# Patient Record
Sex: FEMALE | Race: WHITE | Employment: OTHER | URBAN - METROPOLITAN AREA
[De-identification: names, ages, dates, MRNs, and addresses within clinical notes are randomized per-mention and may not be internally consistent; named-entity substitution may affect disease eponyms.]

---

## 2017-06-22 PROBLEM — N95.2 ATROPHIC VAGINITIS: Status: ACTIVE | Noted: 2017-06-22

## 2017-06-22 PROBLEM — N32.81 URGENCY-FREQUENCY SYNDROME: Status: ACTIVE | Noted: 2017-06-22

## 2017-06-22 PROBLEM — R35.1 NOCTURIA: Status: ACTIVE | Noted: 2017-06-22

## 2017-06-22 PROBLEM — N39.46 MIXED INCONTINENCE: Status: ACTIVE | Noted: 2017-06-22

## 2017-06-22 PROBLEM — N81.4 UTEROVAGINAL PROLAPSE: Status: ACTIVE | Noted: 2017-06-22

## 2017-09-14 PROBLEM — N81.4 UTEROVAGINAL PROLAPSE: Status: RESOLVED | Noted: 2017-06-22 | Resolved: 2017-09-14

## 2017-11-20 PROBLEM — E06.3 HYPOTHYROIDISM DUE TO HASHIMOTO'S THYROIDITIS: Status: ACTIVE | Noted: 2017-11-20

## 2017-11-20 PROBLEM — E03.8 HYPOTHYROIDISM DUE TO HASHIMOTO'S THYROIDITIS: Status: ACTIVE | Noted: 2017-11-20

## 2020-02-21 ENCOUNTER — OFFICE VISIT (OUTPATIENT)
Dept: ENDOCRINOLOGY | Age: 65
End: 2020-02-21
Payer: COMMERCIAL

## 2020-02-21 VITALS
OXYGEN SATURATION: 92 % | BODY MASS INDEX: 35.73 KG/M2 | HEIGHT: 60 IN | SYSTOLIC BLOOD PRESSURE: 138 MMHG | RESPIRATION RATE: 16 BRPM | DIASTOLIC BLOOD PRESSURE: 88 MMHG | HEART RATE: 61 BPM | WEIGHT: 182 LBS

## 2020-02-21 PROCEDURE — 4040F PNEUMOC VAC/ADMIN/RCVD: CPT | Performed by: INTERNAL MEDICINE

## 2020-02-21 PROCEDURE — 99204 OFFICE O/P NEW MOD 45 MIN: CPT | Performed by: INTERNAL MEDICINE

## 2020-02-21 PROCEDURE — 4004F PT TOBACCO SCREEN RCVD TLK: CPT | Performed by: INTERNAL MEDICINE

## 2020-02-21 PROCEDURE — 3017F COLORECTAL CA SCREEN DOC REV: CPT | Performed by: INTERNAL MEDICINE

## 2020-02-21 PROCEDURE — 1090F PRES/ABSN URINE INCON ASSESS: CPT | Performed by: INTERNAL MEDICINE

## 2020-02-21 PROCEDURE — 1123F ACP DISCUSS/DSCN MKR DOCD: CPT | Performed by: INTERNAL MEDICINE

## 2020-02-21 PROCEDURE — G8400 PT W/DXA NO RESULTS DOC: HCPCS | Performed by: INTERNAL MEDICINE

## 2020-02-21 PROCEDURE — G8427 DOCREV CUR MEDS BY ELIG CLIN: HCPCS | Performed by: INTERNAL MEDICINE

## 2020-02-21 PROCEDURE — G8484 FLU IMMUNIZE NO ADMIN: HCPCS | Performed by: INTERNAL MEDICINE

## 2020-02-21 PROCEDURE — G8417 CALC BMI ABV UP PARAM F/U: HCPCS | Performed by: INTERNAL MEDICINE

## 2020-02-21 RX ORDER — ACETAMINOPHEN 160 MG
2000 TABLET,DISINTEGRATING ORAL
COMMUNITY

## 2020-02-21 NOTE — PROGRESS NOTES
[address] [place] [address] Department of Endocrinology Diabetes and Metabolism   [address] [facility] [address]   Phone: [phone]  Fax: [phone]    Date of Service: 2/21/2020    Primary Care Physician: Alphonso Andre  Referring physician: Yolanda Byrd*  Provider: Ruby Pike MD        Reason for the visit:  Primary Hypothyroidism    History of Present Illness: The history is provided by the patient. No  was used. Accuracy of the patient data is excellent. Solomon Ruelas is a very pleasant 71 y.o. female seen today for management of hypothyroidism     The patient was diagnosed with hypothyroidism in her 45s and since then has been on thyroid hormones replacement. Last TSH 1.55 (10/10/2019) from referral  Thyroid Peroxidase Ab 43 (9/28/2017)    She complains of fatigue, dry skin with itchiness, cold and hot intolerance and constipation. Patient denied any history of  swelling in the area of the thyroid gland, weight loss, change in appetite, nervousness, anxiety, irritability, tremor, sweating, muscle weakness or difficulty sleeping. Patient also denied any facial swelling/puffiness, or depressed mood. PAST MEDICAL HISTORY   Past Medical History:   Diagnosis Date    Allergic rhinitis     Family history of thyroid problem     Fibromyalgia     Heartburn     High cholesterol     Hypertension     Hypothyroidism     Neuropathy     Urinary incontinence     urge urine incontinence       PAST SURGICAL HISTORY   Past Surgical History:   Procedure Laterality Date    BACK SURGERY      CATARACT REMOVAL      CYST INCISION AND DRAINAGE      right thigh    EYE SURGERY      HYSTERECTOMY, VAGINAL  08/17/2017    TVH,ant/post/ent rpr,uls,sling,cysto    SPINE SURGERY      cervical spine surgery    TUBAL LIGATION         SOCIAL HISTORY   Tobacco:   reports that she has been smoking cigarettes. She has a 22.50 pack-year smoking history. She has never used smokeless tobacco.  Alcohol:   reports no history of alcohol use. Drugs:   reports no history of drug use. FAMILY HISTORY   Family History   Problem Relation Age of Onset    Cancer Other     Diabetes Other     Thyroid Disease Daughter         hyperthyroidism    Thyroid Disease Daughter         hypothyroidism       ALLERGIES AND DRUG REACTIONS   Allergies   Allergen Reactions    Fish-Derived Products Anaphylaxis     Shell Fish    Gabapentin Other (See Comments)     dizzy    Eggs Or Egg-Derived Products Nausea And Vomiting       CURRENT MEDICATIONS   Current Outpatient Medications   Medication Sig Dispense Refill    Cholecalciferol (VITAMIN D3) 50 MCG (2000 UT) CAPS Take 2,000 Units by mouth      SYNTHROID 100 MCG tablet one tablet daily Mondays to Saturdays and one and half tablets on Sundays (Patient taking differently: 125 mcg one tablet daily qam) 32 tablet 6    azelastine (ASTELIN) 0.1 % nasal spray use 1 spray IN EACH NOSTRIL TWICE DAILY  3    ibuprofen (ADVIL;MOTRIN) 200 MG tablet Take 200 mg by mouth nightly as needed for Pain      vitamin B-12 (CYANOCOBALAMIN) 1000 MCG tablet TAKE 1 TABLET BY MOUTH ONCE DAILY  1    lisinopril (PRINIVIL;ZESTRIL) 2.5 MG tablet TAKE 1 TABLET BY MOUTH ONCE nightly  1    vitamin D (CHOLECALCIFEROL) 1000 UNIT TABS tablet TAKE 1 TABLET BY MOUTH EVERY DAY  1    NEXIUM 40 MG delayed release capsule TAKE 1 CAPSULE BY MOUTH ONCE DAILY  1     No current facility-administered medications for this visit. Review of Systems  Constitutional: No fever, no chills, no diaphoresis, no generalized weakness. HEENT: No blurred vision, No sore throat, no ear pain, no hair loss  Neck: denied any neck swelling, difficulty swallowing,   Cadrdiopulomary: No CP, SOB or palpitation, No orthopnea or PND. No cough or wheezing.   GI: No N/V/D, no constipation, No abdominal pain, no melena or hematochezia   : Denied any dysuria, hematuria, flank pain, discharge,

## 2020-02-21 NOTE — LETTER
[address] [place] [address] Department of Endocrinology Diabetes and Metabolism   [address] [place] [address]   Phone: [phone]  Fax: [phone]      Provider: Hannah Garner MD  Primary Care Physician: Sherry Oconnor   Referring Provider: Tirso Moran*    Patient: Radha Faustin  YOB: 1955  Date of Visit: 2/21/2020      Dear Dr. Roman Kam had the pleasure of seeing your patient Radha Faustin today at endocrine clinic for consultation visit and I enclosed a copy of the office visit completed today. Thank you very much for asking us to participate in the care of this very pleasant patient. Please don't hesitate to call if there are any further questions or concerns. Sincerely   Hannah Garner MD  Endocrinologist, [address] [address]   Phone: [phone]  Fax: [phone]      [address] [place] [address] Department of Endocrinology Diabetes and Metabolism   [address] [place] [address]   Phone: [phone]  Fax: [phone]    Date of Service: 2/21/2020    Primary Care Physician: Sherry Oconnor  Referring physician: Tirso Moran*  Provider: Hannah Garner MD        Reason for the visit:  Primary Hypothyroidism    History of Present Illness: The history is provided by the patient. No  was used. Accuracy of the patient data is excellent. Radha Faustin is a very pleasant 71 y.o. female seen today for management of hypothyroidism     The patient was diagnosed with hypothyroidism in her 45s and since then has been on thyroid hormones replacement. Last TSH 1.55 (10/10/2019) from referral  Thyroid Peroxidase Ab 43 (9/28/2017)    She complains of fatigue, dry skin with itchiness, cold and hot intolerance and constipation.      Patient denied any history of  swelling in the area of the thyroid gland, weight loss, change in appetite, nervousness, anxiety, irritability, tremor, sweating, muscle weakness or difficulty sleeping. Patient also denied any facial swelling/puffiness, or depressed mood. PAST MEDICAL HISTORY   Past Medical History:   Diagnosis Date    Allergic rhinitis     Family history of thyroid problem     Fibromyalgia     Heartburn     High cholesterol     Hypertension     Hypothyroidism     Neuropathy     Urinary incontinence     urge urine incontinence       PAST SURGICAL HISTORY   Past Surgical History:   Procedure Laterality Date    BACK SURGERY      CATARACT REMOVAL      CYST INCISION AND DRAINAGE      right thigh    EYE SURGERY      HYSTERECTOMY, VAGINAL  08/17/2017    TVH,ant/post/ent rpr,uls,sling,cysto    SPINE SURGERY      cervical spine surgery    TUBAL LIGATION         SOCIAL HISTORY   Tobacco:   reports that she has been smoking cigarettes. She has a 22.50 pack-year smoking history. She has never used smokeless tobacco.  Alcohol:   reports no history of alcohol use. Drugs:   reports no history of drug use.     FAMILY HISTORY   Family History   Problem Relation Age of Onset    Cancer Other     Diabetes Other     Thyroid Disease Daughter         hyperthyroidism    Thyroid Disease Daughter         hypothyroidism       ALLERGIES AND DRUG REACTIONS   Allergies   Allergen Reactions    Fish-Derived Products Anaphylaxis     Shell Fish    Gabapentin Other (See Comments)     dizzy    Eggs Or Egg-Derived Products Nausea And Vomiting       CURRENT MEDICATIONS   Current Outpatient Medications   Medication Sig Dispense Refill    Cholecalciferol (VITAMIN D3) 50 MCG (2000 UT) CAPS Take 2,000 Units by mouth      SYNTHROID 100 MCG tablet one tablet daily Mondays to Saturdays and one and half tablets on Sundays (Patient taking differently: 125 mcg one tablet daily qa) 32 tablet 6    azelastine (ASTELIN) 0.1 % nasal spray use 1 spray IN EACH NOSTRIL TWICE DAILY  3  ibuprofen (ADVIL;MOTRIN) 200 MG tablet Take 200 mg by mouth nightly as needed for Pain      vitamin B-12 (CYANOCOBALAMIN) 1000 MCG tablet TAKE 1 TABLET BY MOUTH ONCE DAILY  1    lisinopril (PRINIVIL;ZESTRIL) 2.5 MG tablet TAKE 1 TABLET BY MOUTH ONCE nightly  1    vitamin D (CHOLECALCIFEROL) 1000 UNIT TABS tablet TAKE 1 TABLET BY MOUTH EVERY DAY  1    NEXIUM 40 MG delayed release capsule TAKE 1 CAPSULE BY MOUTH ONCE DAILY  1     No current facility-administered medications for this visit. Review of Systems  Constitutional: No fever, no chills, no diaphoresis, no generalized weakness. HEENT: No blurred vision, No sore throat, no ear pain, no hair loss  Neck: denied any neck swelling, difficulty swallowing,   Cadrdiopulomary: No CP, SOB or palpitation, No orthopnea or PND. No cough or wheezing. GI: No N/V/D, no constipation, No abdominal pain, no melena or hematochezia   : Denied any dysuria, hematuria, flank pain, discharge, or incontinence. Skin: denied any rash, ulcer, Hirsute, or hyperpigmentation. MSK: denied any joint deformity, joint pain/swelling, muscle pain, or back pain. Neuro: no numbess, no tingling, no weakness,     OBJECTIVE    /88 (Site: Left Upper Arm, Position: Sitting, Cuff Size: Medium Adult)   Pulse 61   Resp 16   Ht 5' (1.524 m)   Wt 182 lb (82.6 kg)   SpO2 92%   BMI 35.54 kg/m²    BP Readings from Last 4 Encounters:   02/21/20 138/88   11/20/17 120/72   10/03/17 104/72   09/14/17 124/82     Wt Readings from Last 6 Encounters:   02/21/20 182 lb (82.6 kg)   11/20/17 172 lb 12.8 oz (78.4 kg)   10/03/17 171 lb 3.2 oz (77.7 kg)   09/14/17 176 lb (79.8 kg)   08/30/17 176 lb (79.8 kg)   08/30/17 176 lb (79.8 kg)       Physical examination:  General: awake alert, oriented x3, no abnormal position or movements. HEENT: normocephalic non traumatic  Neck: supple, no LN enlargement, no thyromegaly, no thyroid tenderness, no JVD. Pulm: Clear equal air entry no added sounds, no wheezing or rhonchi    CVS: S1 + S2, no murmur, no heave. Dorsalis pedis pulse palpable   Abd: soft lax, no tenderness, no organomegaly, audible bowel sounds. Skin: warm, no lesions, no rash.   Musculoskeletal: No back tenderness, no kyphosis/scoliosis   Neuro: CN intact, sensation normal , muscle power normal.  Psych: normal mood, and affect    Review of Laboratory Data:  I have reviewed the following:  Lab Results   Component Value Date/Time    WBC 14.6 (H) 08/30/2017 05:01 PM    RBC 3.75 (L) 08/30/2017 05:01 PM    HGB 11.9 08/30/2017 05:01 PM    HCT 35.5 08/30/2017 05:01 PM    MCV 94.5 08/30/2017 05:01 PM    MCH 31.8 08/30/2017 05:01 PM    MCHC 33.7 08/30/2017 05:01 PM    RDW 13.9 08/30/2017 05:01 PM     (H) 08/30/2017 05:01 PM    MPV 7.0 (L) 08/30/2017 05:01 PM      Lab Results   Component Value Date/Time     (L) 08/30/2017 05:01 PM    K 3.8 08/30/2017 05:01 PM    CO2 25 08/30/2017 05:01 PM    BUN 10 08/30/2017 05:01 PM    CREATININE 0.84 08/30/2017 05:01 PM    CALCIUM 9.3 08/30/2017 05:01 PM      Lab Results   Component Value Date/Time    TSH 5.600 (H) 11/20/2017 02:56 PM    T4FREE 1.27 11/20/2017 02:56 PM     Lab Results   Component Value Date    GLUCOSE 134 08/30/2017     Lab Results   Component Value Date    TRIG 77 06/13/2017    HDL 51 06/13/2017    LDLCALC 56 06/13/2017    CHOL 122 06/13/2017     No results found for: VITD25    Medical Records/Labs/Images Review:   I personally reviewed and summarized previous records   All labs and imaging were reviewed independently    [address] [place] [address], a 71 y.o.-old female seen in for following issues     Primary hypothyroidism   · Likely Hashimoto's  · Last TSH 1.55 (10/10/2019) from referral  · Thyroid Peroxidase Ab 43 (9/28/2017)  · Obtain cortisol and ACTH to rule out adrenal insufficiency  · Repeat TSH, T4, Free T4, thyroid Ab  · Obtain B12 and vitamin D level

## 2020-02-22 LAB
BUN BLDV-MCNC: NORMAL MG/DL
CALCIUM SERPL-MCNC: 9.6 MG/DL
CHLORIDE BLD-SCNC: NORMAL MMOL/L
CO2: NORMAL
CORTISOL: 9.1
CREAT SERPL-MCNC: 0.78 MG/DL
GFR CALCULATED: 60
GLUCOSE BLD-MCNC: NORMAL MG/DL
POTASSIUM SERPL-SCNC: 3.9 MMOL/L
SODIUM BLD-SCNC: 138 MMOL/L
T4 FREE: 1.19
T4 TOTAL: 12
TSH SERPL DL<=0.05 MIU/L-ACNC: 0.82 UIU/ML
VITAMIN B-12: 1432
VITAMIN D 25-HYDROXY: 35.3
VITAMIN D2, 25 HYDROXY: NORMAL
VITAMIN D3,25 HYDROXY: NORMAL

## 2020-02-27 ENCOUNTER — TELEPHONE (OUTPATIENT)
Dept: ENDOCRINOLOGY | Age: 65
End: 2020-02-27

## 2020-04-13 RX ORDER — LEVOTHYROXINE SODIUM 125 MCG
125 TABLET ORAL DAILY
Qty: 90 TABLET | Refills: 1 | Status: SHIPPED
Start: 2020-04-13 | End: 2020-05-15 | Stop reason: SDUPTHER

## 2020-04-13 RX ORDER — LEVOTHYROXINE SODIUM 125 MCG
125 TABLET ORAL DAILY
Qty: 90 TABLET | Refills: 1
Start: 2020-04-13 | End: 2020-04-13 | Stop reason: SDUPTHER

## 2020-05-15 RX ORDER — LEVOTHYROXINE SODIUM 125 MCG
TABLET ORAL
Qty: 90 TABLET | Refills: 3 | Status: SHIPPED | OUTPATIENT
Start: 2020-05-15